# Patient Record
Sex: MALE | Race: BLACK OR AFRICAN AMERICAN | NOT HISPANIC OR LATINO | URBAN - METROPOLITAN AREA
[De-identification: names, ages, dates, MRNs, and addresses within clinical notes are randomized per-mention and may not be internally consistent; named-entity substitution may affect disease eponyms.]

---

## 2020-06-16 ENCOUNTER — CLAIMS CREATED FROM THE CLAIM WINDOW (OUTPATIENT)
Dept: URBAN - METROPOLITAN AREA TELEHEALTH 2 | Facility: TELEHEALTH | Age: 65
End: 2020-06-16
Payer: MEDICARE

## 2020-06-16 DIAGNOSIS — K26.9 DUODENAL ULCER: ICD-10-CM

## 2020-06-16 DIAGNOSIS — R10.13 EPIGASTRIC PAIN: ICD-10-CM

## 2020-06-16 DIAGNOSIS — K26.3 ACUTE DUODENAL ULCER: ICD-10-CM

## 2020-06-16 DIAGNOSIS — Z80.0 FAMILY HISTORY OF COLON CANCER: ICD-10-CM

## 2020-06-16 PROCEDURE — G9906 PT RECV TBCO CESS INTERV: HCPCS | Performed by: INTERNAL MEDICINE

## 2020-06-16 PROCEDURE — 3017F COLORECTAL CA SCREEN DOC REV: CPT | Performed by: INTERNAL MEDICINE

## 2020-06-16 PROCEDURE — G9902 PT SCRN TBCO AND ID AS USER: HCPCS | Performed by: INTERNAL MEDICINE

## 2020-06-16 PROCEDURE — 99213 OFFICE O/P EST LOW 20 MIN: CPT | Performed by: INTERNAL MEDICINE

## 2020-06-16 PROCEDURE — 4004F PT TOBACCO SCREEN RCVD TLK: CPT | Performed by: INTERNAL MEDICINE

## 2020-06-16 PROCEDURE — G8417 CALC BMI ABV UP PARAM F/U: HCPCS | Performed by: INTERNAL MEDICINE

## 2020-06-16 PROCEDURE — G8427 DOCREV CUR MEDS BY ELIG CLIN: HCPCS | Performed by: INTERNAL MEDICINE

## 2020-06-16 RX ORDER — METOPROLOL SUCCINATE 25 MG/1
TABLET, EXTENDED RELEASE ORAL
Qty: 0 | Refills: 0 | Status: ACTIVE | COMMUNITY
Start: 1900-01-01

## 2020-06-16 RX ORDER — LOSARTAN POTASSIUM 100 MG/1
TAKE 1 TABLET (100 MG) BY ORAL ROUTE ONCE DAILY TABLET, FILM COATED ORAL 1
Qty: 0 | Refills: 0 | Status: ACTIVE | COMMUNITY
Start: 1900-01-01

## 2020-06-16 RX ORDER — PRAVASTATIN SODIUM 20 MG/1
TABLET ORAL
Qty: 0 | Refills: 0 | Status: ACTIVE | COMMUNITY
Start: 1900-01-01

## 2020-06-16 RX ORDER — PANTOPRAZOLE SODIUM 40 MG/1
TABLET, DELAYED RELEASE ORAL
Qty: 0 | Refills: 0 | Status: ACTIVE | COMMUNITY
Start: 1900-01-01

## 2020-06-16 RX ORDER — GABAPENTIN 300 MG/1
CAPSULE ORAL
Qty: 0 | Refills: 0 | Status: ACTIVE | COMMUNITY
Start: 1900-01-01

## 2020-06-16 NOTE — HPI-TODAY'S VISIT:
Patient presents in follow-up of a large duodenal ulcer with bleeding.  He was scoped in March of this year and the ulcer had healed.  Biopsies were negative for H. pylori.  Patient is currently on pantoprazole once daily. Patient denies abdominal pain, heartburn or indigestion.  He has no nausea or vomiting.  His bowel movements are normal.  There is no evidence of bleeding.  His appetite is good.  He denies ingestion of any fat or fatty powders.  He does not smoke on a daily basis but does smoke occasionally.  He drinks beer on occasion as well.

## 2023-07-18 ENCOUNTER — LAB OUTSIDE AN ENCOUNTER (OUTPATIENT)
Dept: URBAN - NONMETROPOLITAN AREA CLINIC 2 | Facility: CLINIC | Age: 68
End: 2023-07-18

## 2023-07-18 ENCOUNTER — OFFICE VISIT (OUTPATIENT)
Dept: URBAN - NONMETROPOLITAN AREA CLINIC 2 | Facility: CLINIC | Age: 68
End: 2023-07-18
Payer: MEDICARE

## 2023-07-18 ENCOUNTER — WEB ENCOUNTER (OUTPATIENT)
Dept: URBAN - NONMETROPOLITAN AREA CLINIC 2 | Facility: CLINIC | Age: 68
End: 2023-07-18

## 2023-07-18 ENCOUNTER — DASHBOARD ENCOUNTERS (OUTPATIENT)
Age: 68
End: 2023-07-18

## 2023-07-18 VITALS
HEART RATE: 77 BPM | DIASTOLIC BLOOD PRESSURE: 86 MMHG | SYSTOLIC BLOOD PRESSURE: 117 MMHG | HEIGHT: 74 IN | BODY MASS INDEX: 37.5 KG/M2 | WEIGHT: 292.2 LBS

## 2023-07-18 DIAGNOSIS — Z80.0 FAMILY HISTORY OF COLON CANCER: ICD-10-CM

## 2023-07-18 DIAGNOSIS — K26.9 DUODENAL ULCER: ICD-10-CM

## 2023-07-18 DIAGNOSIS — D64.9 ABSOLUTE ANEMIA: ICD-10-CM

## 2023-07-18 DIAGNOSIS — K92.1 HEMATOCHEZIA: ICD-10-CM

## 2023-07-18 PROBLEM — 266987004: Status: ACTIVE | Noted: 2023-07-18

## 2023-07-18 PROBLEM — 13645005: Status: ACTIVE | Noted: 2023-07-18

## 2023-07-18 PROCEDURE — 99214 OFFICE O/P EST MOD 30 MIN: CPT

## 2023-07-18 RX ORDER — POLYETHYLENE GLYCOL 3350, SODIUM SULFATE ANHYDROUS, SODIUM BICARBONATE, SODIUM CHLORIDE, POTASSIUM CHLORIDE 236; 22.74; 6.74; 5.86; 2.97 G/4L; G/4L; G/4L; G/4L; G/4L
3000ML POWDER, FOR SOLUTION ORAL ONCE
Qty: 1 UNSPECIFIED | Refills: 0 | OUTPATIENT
Start: 2023-07-18 | End: 2023-07-19

## 2023-07-18 RX ORDER — METOPROLOL SUCCINATE 25 MG/1
TABLET, EXTENDED RELEASE ORAL
Qty: 0 | Refills: 0 | Status: ACTIVE | COMMUNITY
Start: 1900-01-01

## 2023-07-18 RX ORDER — PRAVASTATIN SODIUM 20 MG/1
TABLET ORAL
Qty: 0 | Refills: 0 | Status: ACTIVE | COMMUNITY
Start: 1900-01-01

## 2023-07-18 RX ORDER — PANTOPRAZOLE SODIUM 40 MG/1
1 TABLET TABLET, DELAYED RELEASE ORAL ONCE A DAY
Qty: 90 TABLET | Refills: 3 | OUTPATIENT
Start: 2023-07-18

## 2023-07-18 RX ORDER — PANTOPRAZOLE SODIUM 40 MG/1
TABLET, DELAYED RELEASE ORAL
Qty: 0 | Refills: 0 | Status: ACTIVE | COMMUNITY
Start: 1900-01-01

## 2023-07-18 RX ORDER — LOSARTAN POTASSIUM 100 MG/1
TAKE 1 TABLET (100 MG) BY ORAL ROUTE ONCE DAILY TABLET, FILM COATED ORAL 1
Qty: 0 | Refills: 0 | Status: ACTIVE | COMMUNITY
Start: 1900-01-01

## 2023-07-18 RX ORDER — CITALOPRAM HYDROBROMIDE 10 MG/1
TABLET ORAL
Qty: 90 EACH | Refills: 0 | Status: ACTIVE | COMMUNITY

## 2023-07-18 RX ORDER — FLUTICASONE FUROATE, UMECLIDINIUM BROMIDE AND VILANTEROL TRIFENATATE 100; 62.5; 25 UG/1; UG/1; UG/1
POWDER RESPIRATORY (INHALATION)
Qty: 60 EACH | Refills: 0 | Status: ACTIVE | COMMUNITY

## 2023-07-18 RX ORDER — GABAPENTIN 300 MG/1
CAPSULE ORAL
Qty: 0 | Refills: 0 | Status: ACTIVE | COMMUNITY
Start: 1900-01-01

## 2023-07-18 NOTE — HPI-TODAY'S VISIT:
Patient presents in follow-up of a large duodenal ulcer with bleeding.  He was scoped in March of this year and the ulcer had healed.  Biopsies were negative for H. pylori.  Patient is currently on pantoprazole once daily. Patient denies abdominal pain, heartburn or indigestion.  He has no nausea or vomiting.  His bowel movements are normal.  There is no evidence of bleeding.  His appetite is good.  He denies ingestion of any fat or fatty powders.  He does not smoke on a daily basis but does smoke occasionally.  He drinks beer on occasion as well.  7/18/2023 Mr. Demarco returns to clinic for follow-up of episode of hematochezia and melena. He was evaluated at the [facility] and directed to return to gastroenterology for further work-up with a EGD and colonoscopy.  At that time he complained of 2 weeks of bloody stool and abdominal pain. Has last endoscopic procedure was completed by Dr. Fajardo in 2020 for melena with discovery of large duodenal ulcer pathology resulted negative.  He did have a CT of his abdomen and pelvis with contrast on 7/11/2023 showing no bowel obstruction or wall thickening.  This did note hepatic steatosis.  And intermittent lower abdominal pain. His last colonoscopy was with Dr. Pete in 2017 revealing sigmoid diverticulosis with polyp ectomy transverse colon. He does have a history with a mother with colon cancer.  He avoids NSAIDs.  Today he states that over the period of time he did observe both red blood, maroon stool and at times dark tarry stool.  He denies any symptoms at present. He continues smoking occasionally and drinking possibly 1 beer per day.  Of note he does state 2 occasions where he was under anesthesia where he had cardiac arrest and subsequent resuscitation.  He does recall an out of body experience from these events.  He states he has seen Dr. Quinn in the past for an specified cardiac condition but has been lost to follow-up as he did not have transportation. He denies use of any blood thinners.

## 2023-07-19 LAB
ABSOLUTE BASOPHILS: 29
ABSOLUTE EOSINOPHILS: 108
ABSOLUTE LYMPHOCYTES: 1602
ABSOLUTE MONOCYTES: 470
ABSOLUTE NEUTROPHILS: 2690
BASOPHILS: 0.6
EOSINOPHILS: 2.2
HEMATOCRIT: 42.6
HEMOGLOBIN: 14
LYMPHOCYTES: 32.7
MCH: 29.5
MCHC: 32.9
MCV: 89.7
MONOCYTES: 9.6
MPV: 10.3
NEUTROPHILS: 54.9
PLATELET COUNT: 168
RDW: 13.5
RED BLOOD CELL COUNT: 4.75
WHITE BLOOD CELL COUNT: 4.9

## 2023-07-20 ENCOUNTER — TELEPHONE ENCOUNTER (OUTPATIENT)
Dept: URBAN - NONMETROPOLITAN AREA CLINIC 2 | Facility: CLINIC | Age: 68
End: 2023-07-20

## 2023-12-01 ENCOUNTER — OFFICE VISIT (OUTPATIENT)
Dept: URBAN - METROPOLITAN AREA MEDICAL CENTER 1 | Facility: MEDICAL CENTER | Age: 68
End: 2023-12-01
Payer: MEDICARE

## 2023-12-01 ENCOUNTER — LAB OUTSIDE AN ENCOUNTER (OUTPATIENT)
Dept: URBAN - NONMETROPOLITAN AREA CLINIC 2 | Facility: CLINIC | Age: 68
End: 2023-12-01

## 2023-12-01 ENCOUNTER — ERX REFILL RESPONSE (OUTPATIENT)
Dept: URBAN - NONMETROPOLITAN AREA CLINIC 2 | Facility: CLINIC | Age: 68
End: 2023-12-01

## 2023-12-01 DIAGNOSIS — K31.89 OTHER DISEASES OF STOMACH AND DUODENUM: ICD-10-CM

## 2023-12-01 DIAGNOSIS — D12.4 ADENOMA OF DESCENDING COLON: ICD-10-CM

## 2023-12-01 DIAGNOSIS — Z87.11 H/O GASTRIC ULCER: ICD-10-CM

## 2023-12-01 DIAGNOSIS — Z12.11 COLON CANCER SCREENING: ICD-10-CM

## 2023-12-01 PROCEDURE — 45385 COLONOSCOPY W/LESION REMOVAL: CPT | Performed by: INTERNAL MEDICINE

## 2023-12-01 PROCEDURE — 43239 EGD BIOPSY SINGLE/MULTIPLE: CPT | Performed by: INTERNAL MEDICINE

## 2023-12-01 RX ORDER — PANTOPRAZOLE SODIUM 40 MG/1
TAKE ONE TABLET BY MOUTH EVERY DAY TABLET, DELAYED RELEASE ORAL
Qty: 90 TABLET | Refills: 1 | OUTPATIENT

## 2023-12-01 RX ORDER — PANTOPRAZOLE SODIUM 40 MG/1
TAKE ONE TABLET BY MOUTH EVERY DAY TABLET, DELAYED RELEASE ORAL
Qty: 90 TABLET | Refills: 3 | OUTPATIENT

## 2023-12-04 LAB
AP CASE REPORT: (no result)
AP FINAL DIAGNOSIS: (no result)
AP GROSS DESCRIPTION: (no result)
AP MICROSCOPIC DESCRIPTION: (no result)